# Patient Record
(demographics unavailable — no encounter records)

---

## 2025-01-10 NOTE — REVIEW OF SYSTEMS
[Memory Loss] : memory loss [Unsteady Walking] : ataxia [Negative] : Heme/Lymph [FreeTextEntry2] : Decreased appetite and weight loss

## 2025-01-10 NOTE — HISTORY OF PRESENT ILLNESS
[FreeTextEntry1] : This is a 92-year-old female for annual health assessment  Specifically we will address her history of atrial fibrillation heart block memory loss urinary tract infections depression hyperparathyroidism hypothyroidism hypercholesterolemia [de-identified] : Patient is accompanied by her daughter who is her spokesman.  Patient has no complaints she is upbeat.  She does have an aide at home and a .  She can do many functions on her own she has had no falls but remains intermittently incontinence of both stool and urine she has no chest pain palpitations or shortness of breath.  But she has lost weight and has lost her appetite

## 2025-01-10 NOTE — HEALTH RISK ASSESSMENT
[No] : No [No falls in past year] : Patient reported no falls in the past year [Other reason not done] : Other reason not done [NO] : No [Change in mental status noted] : Change in mental status noted [Behavior] : difficulty with behavior [Learning/Retaining New Information] : difficulty learning/retaining new information [Handling Complex Tasks] : difficulty handling complex tasks [Reasoning] : difficulty with reasoning [None] : None [Alone] : lives alone [Retired] : retired [High School] : high school [] :  [Feels Safe at Home] : Feels safe at home [Smoke Detector] : smoke detector [Carbon Monoxide Detector] : carbon monoxide detector [Seat Belt] :  uses seat belt [Language] : denies difficulty with language [Spatial Ability and Orientation] : denies difficulty with spatial ability and orientation [Sexually Active] : not sexually active [Reports changes in hearing] : Reports no changes in hearing [Reports changes in vision] : Reports no changes in vision [Reports changes in dental health] : Reports no changes in dental health [Sunscreen] : does not use sunscreen [TB Exposure] : is not being exposed to tuberculosis [de-identified] : Needs [de-identified] : Intermittent assistance [de-identified] : Needs assistance

## 2025-01-10 NOTE — ASSESSMENT
[FreeTextEntry1] : This is a 92-year-old female whose history has been reviewed above  She has a history of hypercholesterolemia she remains on atorvastatin a cholesterol profile has been obtained medication changes predicated on the results  She has a history of diabetes she is on metformin hemoglobin A1c has been obtained hopefully we will be able to reduce the dose a bit as she is having anorexia although I doubt this is the course  She has a history of edema she is on furosemide which will be discontinued she has no edema and she is hypotensive  She has a history of hypothyroidism she remains on Synthroid a TSH has been obtained medication changes predicated on the results  She remains on metoprolol for rate control of her atrial fibrillation  She is on benazepril and memantine for her OMS she tolerates these well and has had some effect  She does have a permanent her pacemaker and for bradycardia arrhythmias  Her new issue is anorexia weight loss combined with anemia.  Her blood count has been drifting down we did add iron TIBC ferritin protein electrophoresis immunodiffusion and B12 however this most likely is iron deficient.  We will empirically start her on iron  In terms of weight loss and decreased appetite I encouraged her daughter to try to encourage her eating try Ensure let her eat what she wants to eat.  In terms of questionable underlying malignancy we will with the permission of the daughters obtain a CAT scan.  They declined colonoscopy at this time.  She is also hypotensive we did discontinue Lasix and decrease to metoprolol to 50

## 2025-01-16 NOTE — HISTORY OF PRESENT ILLNESS
[FreeTextEntry1] : Leta is 92 years old has a history of hypertension diabetes and hyperlipidemia.  She presented in 2014 with near syncope and complete heart block and had a permanent pacemaker inserted  Since that time she has done well.  Echocardiogram in 2014 was unremarkable with normal LV and RV function and no valvular disease  Her pacemaker battery has been changed.  On interrogation at one point she was noted to have periods of paroxysmal atrial fibrillation and was placed on Eliquis.  She is done quite well over the years without any significant issues  On last visit she had developed some increasing edema of her lower extremities and Lasix was prescribed.  In addition she was noted to be hyperkalemic with potassium of 5.6 and was placed on medication to reduce the potassium.  She is presently taking Lasix 20 mg once a day along with her other medications.  She is apparently taking Eliquis only 5 mg once a day instead of twice a day  The daughter claims that when she walks a short distance she is quite fatigued and tired.  She is also tired after eating.  She denies chest pain or significant shortness of breath  In September 2021 echocardiogram revealed moderate LV dysfunction which was somewhat decreased from prior echoes.  The patient did have symptoms at that time of shortness of breath and edema.  She was placed on Lasix with improvement  She is now 90 years old and overall has been feeling well.  She denies shortness of breath or chest pain chest pressure.  She denies edema.    She is fully active and leading a full life.  She is happy with her lifestyle  Bloods in October 2021 did reveal a hemoglobin A1c of 8 and Metformin was increased to 1000 in the morning 500 at night her LDL was 63 HDL 47 creatinine was 1.07 her potassium was 5.1  Blood work in May 2022 potassium 5.1 TSH 8.79 hemoglobin A1c  7.3 LDL 75 HDL 46  creatinine 1.08 BUN 25 potassium 5.1   since last visit the patient continues to feel well without chest pain chest pressure.  she does complain according to her family of shortness of breath when she walks a distance with a walker.  She has minimal edema of her lower extremities.  She offers no significant complaints to me   EKG today July 22, 2022 normal sinus rhythm 100% paced  Visit November 4, 2022 : Patient feels well denies shortness of breath chest pain dizziness or palpitations.  Apparently her bone density is decreased compatible with osteoporosis and she will see her endocrinologist in follow-up for further therapy.  She denies edema orthopnea PND.  She was recently started on memantine for memory difficulties in addition to her other medications.  She is followed carefully by neurology   her previous echo over a year and a half ago had revealed a moderate degree of LV dysfunction but was a technically difficult study..  The patient maintains on stable medications   EKG  on November 4, 2022 P waves are difficult to see possibly normal sinus rhythm though could be underlying A. fib (patient is on Eliquis) 100% paced rhythm   visit February 10 2023:  since the last visit, the patient has been feeling quite well though she is fairly sedentary and does not do much activity.  She is now 91 years old.  She denies shortness of breath chest pain or palpitations.  She remains on stable medication.  Daughter has noted some edema of her lower extremities which has been present for some time   visit May 19, 2023: Patient overall feels well and offers no complaints.  According to the daughter however she is not that active and not walking as much she claims that she is.  She denies chest pain palpitations dizziness or syncope  medications are stable   potassium April 2023 was 4.8  bloods April 2023 TSH 2.41 down from 10.0 LDL 64 creatinine 1.08 GFR 48   EKG 5/19/2023 underlying rhythm difficult to see possibly sinus rhythm versus underlying A-fib 100% paced  Visit September 22, 2023:  EKG September 22, 2023 underlying rhythm difficult to assess probably underlying A-fib 100% paced with left bundle left anterior hemiblock unchanged  Patient offers no complaints of chest pain chest pressure shortness of breath.  According to the daughter she has lost 10 pounds and has decreased her quantity of eating.  She claims to have a reasonable appetite.  She does not do much activity but what ever activity she does do with a walker she feels okay.  Her medications are stable  Visit February 23, 2024: Patient continues to feel extremely well.  She recently saw her internist or blood work physical exam was unremarkable.  She denies chest pain chest pressure shortness of breath.  She remains on stable medication without change  Visit January 16, 2025 The patient recently has had some hypotension weakness weight loss loss of appetite and not eating well and perhaps guaiac positive stool she is quite iron deficient and her hematocrit is now about 30%.  She has also become more confused according to the daughter and is quite forgetful.  The family has obtained worse trying to obtain even more help in the house although the patient feels she is independent.  Actually the patient is very upbeat and denies chest pain chest pressure shortness of breath.  She does take Ensure drinks but does not eat that much during the day.

## 2025-01-16 NOTE — PHYSICAL EXAM
[Well Developed] : well developed [Well Nourished] : well nourished [Normal S1, S2] : normal S1, S2 [No Murmur] : no murmur [Clear Lung Fields] : clear lung fields [Good Air Entry] : good air entry [No Respiratory Distress] : no respiratory distress  [Non Tender] : non-tender [Normal Gait] : normal gait [de-identified] : Looks well for stated age of 92 blood pressure 115  110/70 awake alert well-perfused [de-identified] : Pale conjunctiva [de-identified] : Rhythm regular [de-identified] : Somewhat protuberant [de-identified] : She walks with a walker [de-identified] : No significant edema

## 2025-01-16 NOTE — DISCUSSION/SUMMARY
[FreeTextEntry1] : Patient should continue on present medicines including atorvastatin Eliquis hold Lasix levothyroxine 25 mcg memantine metformin metoprolol apparently was decreased Prolia vitamin D3  I reassured the daughter that presently there are no acute cardiac issues but the etiology of her weight loss lack of appetite needs further investigation  Routine follow-up again in 4 months

## 2025-01-16 NOTE — ASSESSMENT
[FreeTextEntry1] : Patient recently has become anemic has decreased appetite lack of taste and has lost weight.  She has also been more confused at times and is quite forgetful according to the daughter.  She needs more assistance at home according to the daughter.  The patient himself denies any significant complaints.  She has significant iron deficiency anemia which is in the process of being worked up   1.  Status post pacemaker for complete heart block.  Pacing appropriately.  EKG today does suggest normal sinus rhythm though P waves difficult to see followed by 100% pacing.  Pacemaker continues to function normally.  Underlying rhythm no definite P waves seen possibly underlying A-fib 100% paced rhythm no change  2.  Paroxysmal atrial fibrillation asymptomatic less than 0.1% of paced beats on Eliquis.  tolerates Eliquis no bleeding 3.  No acute cardiac issues.  Because of low blood pressure Lasix was discontinued   Presently there are no active cardiac issues .  She has become more forgetful and occasionally confused, has lost weight has a decreased appetite and has an iron deficiency anemia.  This is in the process of being evaluated

## 2025-01-16 NOTE — REASON FOR VISIT
[FreeTextEntry1] : Leta Hyatt 92 years old here for routine follow-up of her cardiac status.K  She recently saw  with some weakness low blood pressure and some confusion she was anemic and has been losing weight.  The patient is self office not much in the way of complaints.  She came with her daughter today for cardiac follow-up.  She was seen on January 16, 2025

## 2025-03-21 NOTE — ASSESSMENT
[FreeTextEntry1] : This is a 93-year-old female whose history has been reviewed above  She has been diminishing most likely diagnosis is a GI malignancy CT of the abdomen and pelvis was unremarkable.  She continues to lose weight  Again discussed with her daughter no further therapy but comfort and treating acute issues  She has had persistent anemia we will continue to follow her CBC iron TIBC and ferritin  Chemistries were obtained and are acceptable  We will continue to follow her urine and urine culture her only manifestations of urinary tract infections have been changes in mental status  Her EKG remains paced  I did discuss hospice with her daughter they will get back to me

## 2025-03-21 NOTE — HISTORY OF PRESENT ILLNESS
[FreeTextEntry1] : This is a 93-year-old female for evaluation and treatment of her atrial fibrillation memory loss anemia and recurrent urinary tract infections [de-identified] : Patient comes in accompanied by her daughter.  Patient continues torr lose weight.  She has difficulty with eating she cannot take Ensure.  She does not complain of dysuria